# Patient Record
(demographics unavailable — no encounter records)

---

## 2025-03-11 NOTE — DISCUSSION/SUMMARY
[FreeTextEntry1] : CAD s/p PCI: 80% mLAD. RRA site improved  Currently on monotherapy with Asa as >6M post PCI   Cont Lipitor 40mg Monitor lipids, CMP-  from 7/2024. Room for improvement.    Mild DD: trend echos, BP decently controlled off meds  Ascending aorta dilation: Serial echos, BP decently controlled off meds  BRIGGS: Likely related to underlying pulmonary disease from 9/11 exposure  RV 6M

## 2025-03-11 NOTE — PHYSICAL EXAM
[Well Developed] : well developed [Well Nourished] : well nourished [No Acute Distress] : no acute distress [Normal Conjunctiva] : normal conjunctiva [Normal Venous Pressure] : normal venous pressure [No Carotid Bruit] : no carotid bruit [Normal S1, S2] : normal S1, S2 [No Rub] : no rub [No Gallop] : no gallop [Murmur] : murmur [Good Air Entry] : good air entry [No Respiratory Distress] : no respiratory distress  [Soft] : abdomen soft [Non Tender] : non-tender [No Masses/organomegaly] : no masses/organomegaly [Normal Bowel Sounds] : normal bowel sounds [Normal Gait] : normal gait [No Edema] : no edema [No Cyanosis] : no cyanosis [No Clubbing] : no clubbing [No Varicosities] : no varicosities [No Rash] : no rash [No Skin Lesions] : no skin lesions [Moves all extremities] : moves all extremities [No Focal Deficits] : no focal deficits [Normal Speech] : normal speech [Alert and Oriented] : alert and oriented [Normal memory] : normal memory [de-identified] : 2/6 systolic murmur at usb [de-identified] : scattered wheeze

## 2025-03-11 NOTE — HISTORY OF PRESENT ILLNESS
[FreeTextEntry1] : 67M CAD s/p PCI, mild DD here for preop eval  Underwent colonoscopy in November, tolerated procedure well  Now on Asa monotherapy, plavix d/c'ed as >6M post PCI Longstanding SOB with both exertion and rest. Thought would improve with stent placement but symptoms persist.  Thought to be 9/11 related Occasional chest pains - unchanged  Denies palpitations, dizziness, lightheadedness, syncope.  HISTORY:  7/2023: Pt seen for worsening BRIGGS CT coronaries with concerning LAD lesion Cardiac cath s/p YAHIR to 80% mLAD  9/11 exposure  Never smoker. Brother had MI, first in his mid 50s. Retired [employer], works school security.   ECG: SR, low voltage Cath 8/3/23: 80% mLAD s/p FRONTIER YAHIR CT heart calcium score 8/10/24: , ascending aorta 4.2cm Echo 7/2023:  1. Normal left ventricular cavity size. The left ventricular wall thickness is normal. The left ventricular systolic function is normal with an ejection fraction visually estimated at 60 to 65 %. There are no regional wall motion abnormalities seen. 2. There is mild (grade 1) left ventricular diastolic dysfunction. 3. Normal right ventricular cavity size, normal right ventricular wall thickness and normal right ventricular systolic function. The tricuspid annular plane systolic excursion (TAPSE) is 2.4 cm (normal >=1.7 cm). 4. The left atrium is normal in size. 5. Fibrocalcific aortic valve sclerosis without stenosis. 6. There is mild calcification of the mitral valve annulus. 7. The proximal ascending aorta is mildly dilated.  Asa 81mg Lipitor 40mg

## 2025-07-28 NOTE — HISTORY OF PRESENT ILLNESS
[FreeTextEntry1] : 68M CAD s/p PCI here for preop eval  Recent dx of prostate Ca Pending retroprostatic spacing gel prior to RT, to help protect the rectum from RT side effects Pt feeling generally well without complains Longstanding SOB with both exertion and rest. Thought would improve with stent placement but symptoms persist.  Thought to be 9/11 related Denies CP   Now on Asa monotherapy, plavix d/c'ed as >6M post PCI  HISTORY:  7/2023: Pt seen for worsening BRIGGS CT coronaries with concerning LAD lesion Cardiac cath s/p YAHIR to 80% mLAD  9/11 exposure  Never smoker. Brother had MI, first in his mid 50s. Retired [employer], works school security.   ECG: SR, low voltage Cath 8/3/23: 80% mLAD s/p FRONTIER YAHIR CT heart calcium score 8/10/24: , ascending aorta 4.2cm Echo 7/2025:  1. Left ventricular cavity is normal in size. Left ventricular wall thickness is normal. Left ventricular systolic function is normal with an ejection fraction visually estimated at 55 to 60 %. There are no regional wall motion abnormalities seen. 2. E-A wave reversal, with normal left ventricular filling pressure. 3. Normal right ventricular cavity size, with normal wall thickness, and normal right ventricular systolic function. Tricuspid annular plane systolic excursion (TAPSE) is 2.6 cm (normal >=1.7 cm). 4. Left atrium is normal in size. 5. Fibrocalcific aortic valve sclerosis without stenosis. Mild aortic regurgitation.  Asa 81mg Lipitor 40mg

## 2025-07-28 NOTE — DISCUSSION/SUMMARY
[FreeTextEntry1] : CAD s/p PCI: 80% mLAD.  Currently on monotherapy with Asa as >6M post PCI   Cont Lipitor 40mg Monitor lipids, CMP- LDL 69 from 7/2025. table AST/ALT   Mild DD: trend echos, BP decently controlled off meds- no DD on recent TTE  Ascending aorta dilation: Serial echos/CT, BP decently controlled off meds  BRIGGS: Likely related to underlying pulmonary disease from 9/11 exposure. No real change after PCI  This patient is able to perform >4 METS of activity without limitation. No evidence of ongoing ischemia, arrhythmia, valvular heart disease or decompensated heart failure.  This patient is optimized from a cardiac standpoint for this low risk surgery.  No further cardiac testing is necessary prior to surgery. Continue aspirin perioperatively

## 2025-07-28 NOTE — PHYSICAL EXAM
[Well Developed] : well developed [Well Nourished] : well nourished [No Acute Distress] : no acute distress [Normal Conjunctiva] : normal conjunctiva [Normal Venous Pressure] : normal venous pressure [No Carotid Bruit] : no carotid bruit [Normal S1, S2] : normal S1, S2 [No Rub] : no rub [No Gallop] : no gallop [Murmur] : murmur [Good Air Entry] : good air entry [No Respiratory Distress] : no respiratory distress  [Soft] : abdomen soft [Non Tender] : non-tender [No Masses/organomegaly] : no masses/organomegaly [Normal Bowel Sounds] : normal bowel sounds [Normal Gait] : normal gait [No Edema] : no edema [No Cyanosis] : no cyanosis [No Clubbing] : no clubbing [No Varicosities] : no varicosities [No Rash] : no rash [No Skin Lesions] : no skin lesions [Moves all extremities] : moves all extremities [No Focal Deficits] : no focal deficits [Normal Speech] : normal speech [Alert and Oriented] : alert and oriented [Normal memory] : normal memory [de-identified] : 2/6 systolic murmur at usb [de-identified] : scattered wheeze